# Patient Record
Sex: FEMALE | Race: ASIAN | NOT HISPANIC OR LATINO | Employment: STUDENT | URBAN - METROPOLITAN AREA
[De-identification: names, ages, dates, MRNs, and addresses within clinical notes are randomized per-mention and may not be internally consistent; named-entity substitution may affect disease eponyms.]

---

## 2022-11-21 NOTE — PATIENT INSTRUCTIONS
Use prescription at night wear cotton gloves to bed  Use Eucerin advanced repair CREAM on hands as much as possibly daily even when you don't have a rash  See dermatology if needed      Lifestyle recommendations:  Being overweight or obese puts you are risk of major health problems including but not limited to: heart disease/heart attack, stroke, high cholesterol, high blood pressure, and diabetes.  This is why it is important to be at a healthy weight for your height.     Exercise 30 minutes 3-5 times a week, if you can only do 10 minutes 3 times a week that is still shown to have great benefit!  Brisk walking even counts for this.  Consider free youtube videos for exercise that fits your needs and lifestyle.     Monitor your caffeine and soda intake, try to minimize these beverages    Drink plenty of water (about 70-80 ounces a day)    Try to eat a vegetable and fruit  with lunch and dinner.  Have a breakfast that contains protein such as eggs or oatmeal.  Decrease your white bread, pasta, and sweets intake.  Increase lean proteins like chicken or pork. Try to eat out 1-2 times a week or less.  Monitor your portion sizes, try using smaller plates if needed.  Eat slowly, this gives you time to be aware that your body is full.   Let me know at any time if you would like a referral to a nutritionist!      Preventive Health Recommendations  Female Ages 21 to 25     Yearly exam:   See your health care provider every year in order to  Review health changes.   Discuss preventive care.    Review your medicines if your doctor has prescribed any.    You should be tested each year for STDs (sexually transmitted diseases).     Talk to your provider about how often you should have cholesterol testing.    Get a Pap test every three years. If you have an abnormal result, your doctor may have you test more often.    If you are at risk for diabetes, you should have a diabetes test (fasting glucose).     Shots:   Get a flu shot  each year.   Get a tetanus shot every 10 years.   Consider getting the shot (vaccine) that prevents cervical cancer (Gardasil).    Nutrition:   Eat at least 5 servings of fruits and vegetables each day.  Eat whole-grain bread, whole-wheat pasta and brown rice instead of white grains and rice.  Get adequate Calcium and Vitamin D.     Lifestyle  Exercise at least 150 minutes a week each week (30 minutes a day, 5 days a week). This will help you control your weight and prevent disease.  Limit alcohol to one drink per day.  No smoking.   Wear sunscreen to prevent skin cancer.  See your dentist every six months for an exam and cleaning.

## 2022-11-21 NOTE — PROGRESS NOTES
SUBJECTIVE:   CC: Adrianna is an 22 year old who presents for preventive health visit.        Patient has been advised of split billing requirements and indicates understanding: Yes     -working currently. [facility] [employer].     Pt is fasting for labs.    PAP is due per pt, had one completed 01/01/2020? @ Age 19?      Rash on hands. Seen years ago. Itchy. Always there. Year round. Maybe worse in the summer. Does wash hands a lot. Uses cerave lotion on hands.   Not used prescriptions. Has not tried cortaid. No fissures. Has had for years. Is dry.   Flares/comes and goes.       Healthy Habits:     Getting at least 3 servings of Calcium per day:  NO    Bi-annual eye exam:  Yes    Dental care twice a year:  Yes    Sleep apnea or symptoms of sleep apnea:  None    Diet:  Regular (no restrictions)    Frequency of exercise:  2-3 days/week    Duration of exercise:  30-45 minutes    Taking medications regularly:  Yes    Medication side effects:  None    PHQ-2 Total Score: 2    Additional concerns today:  No        Today's PHQ-2 Score: No flowsheet data found.    Have you ever done Advance Care Planning? (For example, a Health Directive, POLST, or a discussion with a medical provider or your loved ones about your wishes): No, advance care planning information given to patient to review.  Patient declined advance care planning discussion at this time.    Social History     Tobacco Use     Smoking status: Not on file     Smokeless tobacco: Not on file   Substance Use Topics     Alcohol use: Not on file     If you drink alcohol do you typically have >3 drinks per day or >7 drinks per week? No    No flowsheet data found.No flowsheet data found.    Reviewed orders with patient.  Reviewed health maintenance and updated orders accordingly - Yes  Lab work is in process  Labs reviewed in EPIC  BP Readings from Last 3 Encounters:   11/25/22 137/84    Wt Readings from Last 3 Encounters:   11/25/22 90.3 kg (199 lb)                   There is no problem list on file for this patient.    History reviewed. No pertinent surgical history.    Social History     Tobacco Use     Smoking status: Never     Smokeless tobacco: Never   Substance Use Topics     Alcohol use: Never     No family history on file.      Current Outpatient Medications   Medication Sig Dispense Refill     clobetasol (TEMOVATE) 0.05 % external cream Apply topically At Bedtime To affected area. Stop once rash is gone. Use for up to 2 weeks at a time. 60 g 1       Breast Cancer Screening:        History of abnormal Pap smear: NO - age 21-29 PAP every 3 years recommended     Reviewed and updated as needed this visit by clinical staff                  Reviewed and updated as needed this visit by Provider                 No past medical history on file.   History reviewed. No pertinent surgical history.  OB History    Para Term  AB Living   0 0 0 0 0 0   SAB IAB Ectopic Multiple Live Births   0 0 0 0 0       Review of Systems   Constitutional: Negative for chills and fever.   HENT: Negative for congestion, ear pain, hearing loss and sore throat.    Eyes: Negative for pain and visual disturbance.   Respiratory: Negative for cough and shortness of breath.    Cardiovascular: Negative for chest pain, palpitations and peripheral edema.   Gastrointestinal: Negative for abdominal pain, constipation, diarrhea, heartburn, hematochezia and nausea.   Breasts:  Negative for tenderness, breast mass and discharge.   Genitourinary: Negative for dysuria, frequency, genital sores, hematuria, pelvic pain, urgency, vaginal bleeding and vaginal discharge.   Musculoskeletal: Positive for arthralgias. Negative for joint swelling and myalgias.   Skin: Positive for rash.   Neurological: Negative for dizziness, weakness, headaches and paresthesias.   Psychiatric/Behavioral: Negative for mood changes. The patient is nervous/anxious.           OBJECTIVE:   /84   Pulse 87   Temp  "97.6  F (36.4  C) (Tympanic)   Resp 14   Ht 1.676 m (5' 6\")   Wt 90.3 kg (199 lb)   LMP 11/23/2022 (Exact Date)   SpO2 99%   Breastfeeding No   BMI 32.12 kg/m    Physical Exam  GENERAL: alert, no distress and obese  EYES: Eyes grossly normal to inspection, PERRL and conjunctivae and sclerae normal  HENT: ear canals and TM's normal, nose and mouth without ulcers or lesions  NECK: no adenopathy, no asymmetry, masses, or scars and thyroid normal to palpation  RESP: lungs clear to auscultation - no rales, rhonchi or wheezes  BREAST: normal without masses, tenderness or nipple discharge and no palpable axillary masses or adenopathy  CV: regular rate and rhythm, normal S1 S2, no S3 or S4, no murmur, click or rub, no peripheral edema and peripheral pulses strong  ABDOMEN: soft, nontender, no hepatosplenomegaly, no masses and bowel sounds normal  MS: no gross musculoskeletal defects noted, no edema  SKIN: {:no concerning lesions. On hands she has several areas of pink patches/plaques with dryness and excoriation.  Mostly on palmar aspect but some on dorsal of both hands and fingers.   NEURO: Normal strength and tone, mentation intact and speech normal  PSYCH: mentation appears normal, affect normal/bright    ASSESSMENT/PLAN:   (Z00.00) Routine general medical examination at a health care facility  (primary encounter diagnosis)  Comment:   Plan:     (L30.1) Dyshidrotic eczema  Comment: see below  Plan: clobetasol (TEMOVATE) 0.05 % external cream,         Adult Dermatology Referral, OFFICE/OUTPT         VISIT,WAQAS GRISSOM III            (Z68.32) BMI 32.0-32.9,adult  Comment:   Plan:     (Z11.3) Screening for STDs (sexually transmitted diseases)  Comment:   Plan: NEISSERIA GONORRHOEA PCR, CHLAMYDIA TRACHOMATIS        PCR            (Z13.1) Screening for diabetes mellitus  Comment:   Plan: Basic metabolic panel  (Ca, Cl, CO2, Creat,         Gluc, K, Na, BUN)            (Z13.220) Screening, lipid  Comment:   Plan: Lipid panel " reflex to direct LDL Fasting              Patient has been advised of split billing requirements and indicates understanding: Yes      COUNSELING:  Reviewed preventive health counseling, as reflected in patient instructions       Regular exercise       Healthy diet/nutrition    Billin additional not on preventative   min spent on patient today including chart review, history, exam, and explaining treatment plan and follow-up.       She has no history on file for tobacco use.      Patient Instructions   Use prescription at night wear cotton gloves to bed  Use Eucerin advanced repair CREAM on hands as much as possibly daily even when you don't have a rash  See dermatology if needed      Lifestyle recommendations:  Being overweight or obese puts you are risk of major health problems including but not limited to: heart disease/heart attack, stroke, high cholesterol, high blood pressure, and diabetes.  This is why it is important to be at a healthy weight for your height.     Exercise 30 minutes 3-5 times a week, if you can only do 10 minutes 3 times a week that is still shown to have great benefit!  Brisk walking even counts for this.  Consider free youtube videos for exercise that fits your needs and lifestyle.     Monitor your caffeine and soda intake, try to minimize these beverages    Drink plenty of water (about 70-80 ounces a day)    Try to eat a vegetable and fruit  with lunch and dinner.  Have a breakfast that contains protein such as eggs or oatmeal.  Decrease your white bread, pasta, and sweets intake.  Increase lean proteins like chicken or pork. Try to eat out 1-2 times a week or less.  Monitor your portion sizes, try using smaller plates if needed.  Eat slowly, this gives you time to be aware that your body is full.   Let me know at any time if you would like a referral to a nutritionist!      Preventive Health Recommendations  Female Ages 21 to 25     Yearly exam:     See your health care provider  every year in order to  o Review health changes.   o Discuss preventive care.    o Review your medicines if your doctor has prescribed any.      You should be tested each year for STDs (sexually transmitted diseases).       Talk to your provider about how often you should have cholesterol testing.      Get a Pap test every three years. If you have an abnormal result, your doctor may have you test more often.      If you are at risk for diabetes, you should have a diabetes test (fasting glucose).     Shots:     Get a flu shot each year.     Get a tetanus shot every 10 years.     Consider getting the shot (vaccine) that prevents cervical cancer (Gardasil).    Nutrition:     Eat at least 5 servings of fruits and vegetables each day.    Eat whole-grain bread, whole-wheat pasta and brown rice instead of white grains and rice.    Get adequate Calcium and Vitamin D.     Lifestyle    Exercise at least 150 minutes a week each week (30 minutes a day, 5 days a week). This will help you control your weight and prevent disease.    Limit alcohol to one drink per day.    No smoking.     Wear sunscreen to prevent skin cancer.    See your dentist every six months for an exam and cleaning.      CJ Pizarro [facility]

## 2022-11-22 ASSESSMENT — ENCOUNTER SYMPTOMS
NERVOUS/ANXIOUS: 1
ARTHRALGIAS: 1
CHILLS: 0
SHORTNESS OF BREATH: 0
SORE THROAT: 0
JOINT SWELLING: 0
COUGH: 0
MYALGIAS: 0
HEMATURIA: 0
ABDOMINAL PAIN: 0
HEARTBURN: 0
HEADACHES: 0
NAUSEA: 0
BREAST MASS: 0
PALPITATIONS: 0
CONSTIPATION: 0
WEAKNESS: 0
PARESTHESIAS: 0
FREQUENCY: 0
DYSURIA: 0
DIZZINESS: 0
EYE PAIN: 0
HEMATOCHEZIA: 0
DIARRHEA: 0
FEVER: 0

## 2022-11-25 ENCOUNTER — OFFICE VISIT (OUTPATIENT)
Dept: FAMILY MEDICINE | Facility: CLINIC | Age: 22
End: 2022-11-25
Payer: COMMERCIAL

## 2022-11-25 VITALS
TEMPERATURE: 97.6 F | BODY MASS INDEX: 31.98 KG/M2 | WEIGHT: 199 LBS | HEIGHT: 66 IN | OXYGEN SATURATION: 99 % | DIASTOLIC BLOOD PRESSURE: 84 MMHG | SYSTOLIC BLOOD PRESSURE: 137 MMHG | HEART RATE: 87 BPM | RESPIRATION RATE: 14 BRPM

## 2022-11-25 DIAGNOSIS — L30.1 DYSHIDROTIC ECZEMA: ICD-10-CM

## 2022-11-25 DIAGNOSIS — Z13.1 SCREENING FOR DIABETES MELLITUS: ICD-10-CM

## 2022-11-25 DIAGNOSIS — Z13.220 SCREENING, LIPID: ICD-10-CM

## 2022-11-25 DIAGNOSIS — Z11.3 SCREENING FOR STDS (SEXUALLY TRANSMITTED DISEASES): ICD-10-CM

## 2022-11-25 DIAGNOSIS — Z00.00 ROUTINE GENERAL MEDICAL EXAMINATION AT A HEALTH CARE FACILITY: Primary | ICD-10-CM

## 2022-11-25 LAB
ANION GAP SERPL CALCULATED.3IONS-SCNC: 3 MMOL/L (ref 3–14)
BUN SERPL-MCNC: 10 MG/DL (ref 7–30)
CALCIUM SERPL-MCNC: 9 MG/DL (ref 8.5–10.1)
CHLORIDE BLD-SCNC: 109 MMOL/L (ref 94–109)
CHOLEST SERPL-MCNC: 180 MG/DL
CO2 SERPL-SCNC: 27 MMOL/L (ref 20–32)
CREAT SERPL-MCNC: 0.71 MG/DL (ref 0.52–1.04)
FASTING STATUS PATIENT QL REPORTED: YES
GFR SERPL CREATININE-BSD FRML MDRD: >90 ML/MIN/1.73M2
GLUCOSE BLD-MCNC: 105 MG/DL (ref 70–99)
HDLC SERPL-MCNC: 45 MG/DL
LDLC SERPL CALC-MCNC: 97 MG/DL
NONHDLC SERPL-MCNC: 135 MG/DL
POTASSIUM BLD-SCNC: 4.3 MMOL/L (ref 3.4–5.3)
SODIUM SERPL-SCNC: 139 MMOL/L (ref 133–144)
TRIGL SERPL-MCNC: 188 MG/DL

## 2022-11-25 PROCEDURE — 99385 PREV VISIT NEW AGE 18-39: CPT | Performed by: PHYSICIAN ASSISTANT

## 2022-11-25 PROCEDURE — 99213 OFFICE O/P EST LOW 20 MIN: CPT | Mod: 25 | Performed by: PHYSICIAN ASSISTANT

## 2022-11-25 PROCEDURE — 87491 CHLMYD TRACH DNA AMP PROBE: CPT | Performed by: PHYSICIAN ASSISTANT

## 2022-11-25 PROCEDURE — 80061 LIPID PANEL: CPT | Performed by: PHYSICIAN ASSISTANT

## 2022-11-25 PROCEDURE — 36415 COLL VENOUS BLD VENIPUNCTURE: CPT | Performed by: PHYSICIAN ASSISTANT

## 2022-11-25 PROCEDURE — 87591 N.GONORRHOEAE DNA AMP PROB: CPT | Performed by: PHYSICIAN ASSISTANT

## 2022-11-25 PROCEDURE — 80048 BASIC METABOLIC PNL TOTAL CA: CPT | Performed by: PHYSICIAN ASSISTANT

## 2022-11-25 RX ORDER — CLOBETASOL PROPIONATE 0.5 MG/G
CREAM TOPICAL AT BEDTIME
Qty: 60 G | Refills: 1 | Status: SHIPPED | OUTPATIENT
Start: 2022-11-25

## 2022-11-25 ASSESSMENT — ENCOUNTER SYMPTOMS
NERVOUS/ANXIOUS: 1
DIARRHEA: 0
WEAKNESS: 0
EYE PAIN: 0
CONSTIPATION: 0
COUGH: 0
DYSURIA: 0
HEMATURIA: 0
FREQUENCY: 0
BREAST MASS: 0
NAUSEA: 0
PALPITATIONS: 0
CHILLS: 0
ARTHRALGIAS: 1
MYALGIAS: 0
HEARTBURN: 0
SORE THROAT: 0
HEADACHES: 0
ABDOMINAL PAIN: 0
PARESTHESIAS: 0
HEMATOCHEZIA: 0
SHORTNESS OF BREATH: 0
DIZZINESS: 0
JOINT SWELLING: 0
FEVER: 0

## 2022-11-25 ASSESSMENT — PAIN SCALES - GENERAL: PAINLEVEL: NO PAIN (0)

## 2022-11-25 NOTE — RESULT ENCOUNTER NOTE
Tommy Rodas,       Your recent test results are attached, if you have any questions or concerns please feel free to contact me via e-mail or call [phone].  Cholesterol and blood sugar are a bit elevated.  Working on losing weight should help this.  Kidney function to goal.       It was a pleasure to see you at your recent office visit.      Sincerely,  Aspen Menjivar PA-C

## 2022-11-26 LAB
C TRACH DNA SPEC QL NAA+PROBE: NEGATIVE
N GONORRHOEA DNA SPEC QL NAA+PROBE: NEGATIVE

## 2022-11-28 NOTE — RESULT ENCOUNTER NOTE
Tommy Rodas,       Your recent test results are attached, if you have any questions or concerns please feel free to contact me via e-mail or call [phone].  Negative chlamydia and gonorrhea.       It was a pleasure to see you at your recent office visit.      Sincerely,  Apsen Menjivar PA-C

## 2022-12-22 NOTE — PROGRESS NOTES
Assessment & Plan     Cervical cancer screening    - Pap Screen only - recommended age 21 - 24 years        Aspen Menjivar PA-C  [facility]    Daisy Rodas is a 22 year old accompanied by her Self, presenting for the following health issues:  Gyn Exam      History of Present Illness       Reason for visit:  Follow-up physical exam (papsmear)    She eats 2-3 servings of fruits and vegetables daily.She consumes 1 sweetened beverage(s) daily.She exercises with enough effort to increase her heart rate 10 to 19 minutes per day.  She exercises with enough effort to increase her heart rate 3 or less days per week.        Had pap age 19. Now due. Not due for std screening just had. No new partners.   No concerns.       Review of Systems   Constitutional, HEENT, cardiovascular, pulmonary, GI, , musculoskeletal, neuro, skin, endocrine and psych systems are negative, except as otherwise noted.      Objective    /83   Pulse 89   Temp 97.6  F (36.4  C) (Tympanic)   Resp 14   Wt 89.8 kg (198 lb)   LMP 11/23/2022 (Exact Date)   SpO2 99%   Breastfeeding No   BMI 31.96 kg/m    Body mass index is 31.96 kg/m .  Physical Exam   GENERAL: healthy, alert and no distress  RESP: lungs clear to auscultation - no rales, rhonchi or wheezes   (female): normal female external genitalia, normal urethral meatus, vaginal mucosa, normal cervix without masses or discharge. Some blood after pap collection, she just finished period  MS: no gross musculoskeletal defects noted, no edema  PSYCH: mentation appears normal, affect normal/bright

## 2022-12-29 ENCOUNTER — OFFICE VISIT (OUTPATIENT)
Dept: FAMILY MEDICINE | Facility: CLINIC | Age: 22
End: 2022-12-29
Payer: COMMERCIAL

## 2022-12-29 VITALS
BODY MASS INDEX: 31.96 KG/M2 | RESPIRATION RATE: 14 BRPM | OXYGEN SATURATION: 99 % | TEMPERATURE: 97.6 F | SYSTOLIC BLOOD PRESSURE: 117 MMHG | HEART RATE: 89 BPM | WEIGHT: 198 LBS | DIASTOLIC BLOOD PRESSURE: 83 MMHG

## 2022-12-29 DIAGNOSIS — Z12.4 CERVICAL CANCER SCREENING: ICD-10-CM

## 2022-12-29 PROCEDURE — G0145 SCR C/V CYTO,THINLAYER,RESCR: HCPCS | Performed by: PHYSICIAN ASSISTANT

## 2022-12-29 PROCEDURE — 99213 OFFICE O/P EST LOW 20 MIN: CPT | Performed by: PHYSICIAN ASSISTANT

## 2022-12-29 ASSESSMENT — PAIN SCALES - GENERAL: PAINLEVEL: NO PAIN (0)

## 2023-01-02 LAB
BKR LAB AP GYN ADEQUACY: NORMAL
BKR LAB AP GYN INTERPRETATION: NORMAL
BKR LAB AP HPV REFLEX: NO
BKR LAB AP PREVIOUS ABNORMAL: NORMAL
PATH REPORT.COMMENTS IMP SPEC: NORMAL
PATH REPORT.COMMENTS IMP SPEC: NORMAL
PATH REPORT.RELEVANT HX SPEC: NORMAL

## 2023-09-07 ENCOUNTER — OFFICE VISIT (OUTPATIENT)
Dept: DERMATOLOGY | Facility: CLINIC | Age: 23
End: 2023-09-07
Payer: COMMERCIAL

## 2023-09-07 DIAGNOSIS — L21.9 DERMATITIS, SEBORRHEIC: ICD-10-CM

## 2023-09-07 DIAGNOSIS — L20.82 FLEXURAL ECZEMA: Primary | ICD-10-CM

## 2023-09-07 PROCEDURE — 99204 OFFICE O/P NEW MOD 45 MIN: CPT | Performed by: NURSE PRACTITIONER

## 2023-09-07 RX ORDER — FLUOCINONIDE TOPICAL SOLUTION USP, 0.05% 0.5 MG/ML
SOLUTION TOPICAL 2 TIMES DAILY
Qty: 60 ML | Refills: 2 | Status: SHIPPED | OUTPATIENT
Start: 2023-09-07

## 2023-09-07 RX ORDER — TRIAMCINOLONE ACETONIDE 1 MG/G
OINTMENT TOPICAL 2 TIMES DAILY
Qty: 80 G | Refills: 1 | Status: SHIPPED | OUTPATIENT
Start: 2023-09-07

## 2023-09-07 NOTE — PATIENT INSTRUCTIONS
Hand dermatitis.   -I have prescribed a steroid ointment called clobetasol 0.05% ointment to use twice daily until resolved and then as needed for flares.    -at night, soak hands in water, apply steroid ointment to rashy areas, and a thick layer of petroleum jelly/Vaseline to the rest of the hands. Cover with cotton gloves overnight during sleep.   -reapply another time during the day when you can keep on for 1+ hours without washing hands  -wear gloves when washing dishes (currently cotton lined gloves or use your cotton gloves underneath your rubber gloves as a barrier), using cleaning supplies, or when hands would otherwise be immersed in soapy water.   -each time you wash your hands you should apply a moisturizing cream immediately afterwards. Examples include Cetaphil cream, Cera Ve Cream, Vanicream or vaseline  -Use a very mild soap such as dove bar soap or even better would be vanicream bar soap. Cut a bar into slices so you can have one in each area that your wash your hands so that she did not to come in contact with any harsh soaps that may be very drying to the hands or cause an allergy.  -The very mindful about anything this coming in contact with your hands that may cause an allergy.  Do not put any moisturizers or chemicals or products on your hands other than things listed above in case you are allergic to something that you are putting on your hands.  Try to avoid contact with rubbers and latex in case this is a problem as well.       ---------------------------------------------------------------------------------------------  Eczema Action Plan    Name: Adrianna Rain Provider: CHEMA ZHONG Date: 9/7/2023    Step 1: Eczema Under Control (Skin is soft and flexible, not red or itchy)  Moisturize the whole body at least two times a day.  Watch for signs that the skin is turning red, itchy, or dry.  Use a cream in a jar from the list below. Do not use lotions from a pump.  Suggested creams:   "CeraVe Cream, Cetaphil Cream, Vanicream, Aquaphor, Vaseline  Use a gentle cleanser/soap in the bath/shower such as dove sensitive cleanser or Cetaphil cleanser only to the armpits and groin areas, or where you are visibly dirty. All other areas should get washed with water only. Do not scrub. After bathing pat the skin dry with a towel instead of rubbing dry. Apply your moisturizer while you're still slightly damp to lock in some of the moisture.     Step 2:  Eczema Flare (Eczema is out of control and not responding to maintenance care)       Continue above procedures  Also, use prescribed steroid ointment two times a day for up to two weeks per month. Apply to red and itchy areas. Put the steroid on the skin first before other creams.     Body:triamcinolone 0.1% ointment     Use one fingertip unit of the ointment for eczema. A fingertip unit is the amount of ointment from the first bend in finger to the fingertip. This amount will cover an area equal to two adult hands. Using steroids for extended periods of time can thin the skin and cause stretch marks. Never use for longer than 3-4 weeks before following up with your provider. Please follow up in clinic if rash persists and does not resolve with this regimen in 1 months time.     Moisturize your whole body two times a day with a suggested cream.    Wet Wraps  If your rash is very itchy or getting out of control you can also try applying what we call \"wet wraps\". Use your moisturizers/medications where instructed and THEN cover that body area with a wet cotton clothing or cloth and put a second dry layer over the top. You can do this for a short time or even overnight. For example: if your hands are the problem area, try wet cotton gloves or socks and cover with a dry pair. Or if your legs are affected, wet a pair of cotton pajama pants and ring them out then wear them covered with a dry pair overnight.     About Dry Skin    What is dry skin?  Common skin " problem  Can be worse during the winter   Affects all ages  Occurs in people with or without other skin problems    What does it look like?  Fine lines in the skin become more visible   Rough feeling skin   Flaky skin  Most common on the arms and legs  Skin can become cracked, especially on the hands and feet    What are some problems caused by dry skin?   Itching  Rubbing or scratching can cause thickened, rough skin patches  Cracks in skin can be painful  Red, itchy, scaly skin (called eczema) can occur  Yellow crusting or pus could be signs of an infection    What causes dry skin?  A lack of water in the top layer of the skin  Too much soapy water,  hot water, or harsh chemicals  Aging and sun damage    How do I treat dry skin?  Shower or bathe daily for under ten minutes with lukewarm water and mild soap.  Pat yourself dry with a towel gently and leave your skin slightly damp.  Use moisturizing cream or ointment right away.  Avoid lotions.    What kind of mild soap should I be using?  Camay , Dove , Tone , Neutrogena , Purpose , or Oil of Olay   A non-detergent cleanser, like Cetaphil , can be used.    What should I stay away from?  Scented soaps   Bath oils    What moisturizers should I be using?  Cetaphil Cream,CeraVe Cream, Vanicream, Eucerin, Aquaphor, or Vaseline   Always apply after showering or bathing.  Reapply throughout the day, if possible.  If dry skin affects your hands, always reapply after handwashing.    What else should I know?  Using a humidifier during winter months may help.  If dry skin gets worse or if eczema develops, a steroid cream may be needed.   ------------------------------------------------------------------------------------------------------------------------------------------    Seborrheic dermatitis  I have prescribed a prescription antidandruff shampoo and/or cream called ketoconazole 2%.    For scalp rash:  Rub shampoo gently into scalp.  If you have a lot of hair you do not  have to wash all of your hair with this shampoo it is more of a scalp treatment.  You can go ahead and use your regular hair care regimen after the ketoconazole is washed out.   Leave on for at least five minutes before rinsing.  You will feel better with daily use.  As redness, flaking, and itching get better you can use the shampoo less.     For face/ear rash:  Use the ketoconazole cream once daily until the rash is clear and then okay to decrease use.     --------------------------------------------------------------------------------------------------------------------------------------------------------------------------------------    I have also prescribed a topical steroid that you can use when the rash is bothersome, itchy, red, or flaky.  Steroids can thin the skin with long-term use so only use the topical steroid when you actively have a rash and it is bothering you.  When the rash goes away stop using the steroid and only use it for flares of rash.  If you are using this for longer than 1 month at a time and the rash is not going away please contact the clinic for follow-up visit.    The topical steroid you may have been prescribed are:   fluocinonide 0.05% solution for the scalp    Seborrheic dermatitis is a rash that will continue to flare periodically.  If you continue to use the ketoconazole cream or shampoo where you get the rash as prevention it will likely flare less.  It is okay to continue to use the ketoconazole even when you do not have a rash.  Most people can decrease the amount of times they use it to once or twice weekly to prevent the rash and will use more often when they flare with the redness, itching, or flaking.

## 2023-09-07 NOTE — LETTER
9/7/2023         RE: Adrianna Rain  [id number] [facility] [id number]        Dear Colleague,    Thank you for referring your patient, Adrianna Rain, [facility]. Please see a copy of my visit note below.    [facility] Dermatology Note  Encounter Date: Sep 7, 2023  Office Visit     Reviewed patients past medical history and pertinent chart review prior to patients visit today.     Dermatology Problem List:  Eczematous dermatitis  Hand eczema  Seb derm    ____________________________________________    Assessment & Plan:     # Eczematous dermatitis    -Start triamcinolone 0.1% ointment twice daily for 2-4 weeks, decreasing as patients rash improves, repeat cycle for flares. If not fully resolved in 1 month, patient advised to return to clinic for reevaluation. Discussed risk of skin atrophy with long term chronic use. Not for face, armpits or groin.   -When bathing, use gentle soap such as Dove for Sensitive Skin and lukewarm water on amrpits, groin, and other visibly dirty areas, all other areas let the water run over but no soap is necessary. Pat skin dry instead of vigorous rubbing. Encouraged regular use of an emollient such as Vanicream, Cera Ve Cream or Cetaphil Cream to the entire body.   -Start a humidifier in bedroom when sleeping if possible for patient. Clean regularly.     # Hand eczema.   -continue clobetasol  0.05% ointment to use BID until resolved and then BID PRN for flares.  Councled about use and side effects of thinning of the skin, striae, erythema, and worsening of rash.   Not for use in places other than hands or feet.   -apply steroid ointment at night to rashy areas, and petroleum jelly to the rest of the hands. Cover with cotton gloves overnight during sleep.   -reapply another time during the day when patient can keep on for 1+ hours without washing hands  -wear gloves when washing dishes, using cleaning supplies, or when hands would  otherwise be immersed in soapy water.   -each time patient washes hands they should apply a moisturizing cream immediately afterwards. Examples include Cetaphil cream, Cera Ve Cream or Vanicream.       # Seborrheic dermatitis, scalp. Discussed that this is a recurring rash for most people and will need some maintenance even after rash has resolved. Given prescription for ketoconazole 2% shampoo to use every 1-2 days while rash is present, and ongoing 1-3 times weekly when rash is well controlled. Advised to leave on for 2-5 minutes before rinsing. Also given fluocinonide 0.05% solution to use 1-2 times daily when rash is itchy. Stop use when rash is not symptomatic and use for flares only. Advised to avoid face, groin and skin folds. Councled about use and side effects of thinning of the skin, striae, erythema, and worsening of rash.         Return to clinic in 1 month if not well controlled, sooner PRN for new or worsening conditions.     Odilia Hernandez, [facility]  Dermatology   _______________________________________    CC: Rash (Dyshidrotic eczema- patches on fingers and behind armpits, currently using clobetasol which helps treat areas. )    HPI:  Ms. Adrianna Rain is a(n) 23 year old female who presents today for rash on right back of armpit, insides of elbows and knees, scalp and tops of fingers. All of these have been going on for years. . She has some Clobetasol 0.05% ointment that she uses BID x 2 weeks to tops of fingers but then stops even if rash is not fully resolved. She also has ketoconazole shampoo that she has used for a long time now but when she stops it the rash comes back. It also seems to be responding slower than it used to to the shampoo.     Patient is otherwise feeling well, without additional skin concerns.      Physical Exam:  Vitals: There were no vitals taken for this visit.  SKIN: Focused examination of scalp, arms, hands, right axilla was performed.  - pink annular scaly patches on the popliteal  fossas, antecubital fossas, posterior right axilla and dorsum fingers  - erythema and scale diffuse on scalp    - No other lesions of concern on areas examined.     Medications:  Current Outpatient Medications   Medication     clobetasol (TEMOVATE) 0.05 % external cream     No current facility-administered medications for this visit.      Past Medical History:   There is no problem list on file for this patient.    History reviewed. No pertinent past medical history.    CC Aspen Menjivar PA-C  [address]  [address]  [place] [address] on close of this encounter.       Again, thank you for allowing me to participate in the care of your patient.        Sincerely,        SALOME Noland CNP

## 2023-09-08 NOTE — PROGRESS NOTES
[facility] Dermatology Note  Encounter Date: Sep 7, 2023  Office Visit     Reviewed patients past medical history and pertinent chart review prior to patients visit today.     Dermatology Problem List:  Eczematous dermatitis  Hand eczema  Seb derm    ____________________________________________    Assessment & Plan:     # Eczematous dermatitis    -Start triamcinolone 0.1% ointment twice daily for 2-4 weeks, decreasing as patients rash improves, repeat cycle for flares. If not fully resolved in 1 month, patient advised to return to clinic for reevaluation. Discussed risk of skin atrophy with long term chronic use. Not for face, armpits or groin.   -When bathing, use gentle soap such as Dove for Sensitive Skin and lukewarm water on amrpits, groin, and other visibly dirty areas, all other areas let the water run over but no soap is necessary. Pat skin dry instead of vigorous rubbing. Encouraged regular use of an emollient such as Vanicream, Cera Ve Cream or Cetaphil Cream to the entire body.   -Start a humidifier in bedroom when sleeping if possible for patient. Clean regularly.     # Hand eczema.   -continue clobetasol  0.05% ointment to use BID until resolved and then BID PRN for flares.  Councled about use and side effects of thinning of the skin, striae, erythema, and worsening of rash.   Not for use in places other than hands or feet.   -apply steroid ointment at night to rashy areas, and petroleum jelly to the rest of the hands. Cover with cotton gloves overnight during sleep.   -reapply another time during the day when patient can keep on for 1+ hours without washing hands  -wear gloves when washing dishes, using cleaning supplies, or when hands would otherwise be immersed in soapy water.   -each time patient washes hands they should apply a moisturizing cream immediately afterwards. Examples include Cetaphil cream, Cera Ve Cream or Vanicream.       # Seborrheic dermatitis, scalp. 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 He will continue on Cymbalta 40mg 1 tab once daily as is without change and will return to the office in 6 months for re-evaluation and follow up.   I have personally reviewed with the PA, this patients history and physical exam findings, as documented above. I have discussed the relevant areas of concern, having direct implications to the presenting problems and illnesses, and have personally examined all pertinent findings which impact on the prior neurological treatment.   Teri Catherine MS, PA-LIVIA Sanford MD    that this is a recurring rash for most people and will need some maintenance even after rash has resolved. Given prescription for ketoconazole 2% shampoo to use every 1-2 days while rash is present, and ongoing 1-3 times weekly when rash is well controlled. Advised to leave on for 2-5 minutes before rinsing. Also given fluocinonide 0.05% solution to use 1-2 times daily when rash is itchy. Stop use when rash is not symptomatic and use for flares only. Advised to avoid face, groin and skin folds. Councled about use and side effects of thinning of the skin, striae, erythema, and worsening of rash.         Return to clinic in 1 month if not well controlled, sooner PRN for new or worsening conditions.     Odilia Hernandez, [facility]  Dermatology   _______________________________________    CC: Rash (Dyshidrotic eczema- patches on fingers and behind armpits, currently using clobetasol which helps treat areas. )    HPI:  Ms. Adrianna Rain is a(n) 23 year old female who presents today for rash on right back of armpit, insides of elbows and knees, scalp and tops of fingers. All of these have been going on for years. . She has some Clobetasol 0.05% ointment that she uses BID x 2 weeks to tops of fingers but then stops even if rash is not fully resolved. She also has ketoconazole shampoo that she has used for a long time now but when she stops it the rash comes back. It also seems to be responding slower than it used to to the shampoo.     Patient is otherwise feeling well, without additional skin concerns.      Physical Exam:  Vitals: There were no vitals taken for this visit.  SKIN: Focused examination of scalp, arms, hands, right axilla was performed.  - pink annular scaly patches on the popliteal fossas, antecubital fossas, posterior right axilla and dorsum fingers  - erythema and scale diffuse on scalp    - No other lesions of concern on areas examined.     Medications:  Current Outpatient Medications   Medication    clobetasol  (TEMOVATE) 0.05 % external cream     No current facility-administered medications for this visit.      Past Medical History:   There is no problem list on file for this patient.    History reviewed. No pertinent past medical history.    CC Aspen Menjivar PA-C  [address]  [address]  [place] [address] on close of this encounter.

## 2024-02-11 ENCOUNTER — HEALTH MAINTENANCE LETTER (OUTPATIENT)
Age: 24
End: 2024-02-11

## 2025-03-08 ENCOUNTER — HEALTH MAINTENANCE LETTER (OUTPATIENT)
Age: 25
End: 2025-03-08